# Patient Record
Sex: MALE | Race: WHITE | URBAN - METROPOLITAN AREA
[De-identification: names, ages, dates, MRNs, and addresses within clinical notes are randomized per-mention and may not be internally consistent; named-entity substitution may affect disease eponyms.]

---

## 2022-01-25 ENCOUNTER — PROCEDURE VISIT (OUTPATIENT)
Dept: AUDIOLOGY | Age: 82
End: 2022-01-25
Payer: MEDICARE

## 2022-01-25 ENCOUNTER — OFFICE VISIT (OUTPATIENT)
Dept: ENT CLINIC | Age: 82
End: 2022-01-25
Payer: MEDICARE

## 2022-01-25 ENCOUNTER — TELEPHONE (OUTPATIENT)
Dept: ENT CLINIC | Age: 82
End: 2022-01-25

## 2022-01-25 VITALS — TEMPERATURE: 97.5 F | HEART RATE: 75 BPM | DIASTOLIC BLOOD PRESSURE: 84 MMHG | SYSTOLIC BLOOD PRESSURE: 135 MMHG

## 2022-01-25 DIAGNOSIS — K21.9 GASTROESOPHAGEAL REFLUX DISEASE, UNSPECIFIED WHETHER ESOPHAGITIS PRESENT: Primary | ICD-10-CM

## 2022-01-25 DIAGNOSIS — H90.A32 MIXED CONDUCTIVE AND SENSORINEURAL HEARING LOSS OF LEFT EAR WITH RESTRICTED HEARING OF RIGHT EAR: ICD-10-CM

## 2022-01-25 DIAGNOSIS — J30.9 ALLERGIC RHINITIS, UNSPECIFIED SEASONALITY, UNSPECIFIED TRIGGER: ICD-10-CM

## 2022-01-25 DIAGNOSIS — C44.02 SQUAMOUS CELL CARCINOMA OF SKIN OF LOWER LIP: ICD-10-CM

## 2022-01-25 DIAGNOSIS — H61.22 IMPACTED CERUMEN OF LEFT EAR: ICD-10-CM

## 2022-01-25 DIAGNOSIS — K21.9 LARYNGOPHARYNGEAL REFLUX (LPR): ICD-10-CM

## 2022-01-25 DIAGNOSIS — H90.3 SENSORINEURAL HEARING LOSS (SNHL) OF BOTH EARS: Primary | ICD-10-CM

## 2022-01-25 PROCEDURE — G8484 FLU IMMUNIZE NO ADMIN: HCPCS | Performed by: STUDENT IN AN ORGANIZED HEALTH CARE EDUCATION/TRAINING PROGRAM

## 2022-01-25 PROCEDURE — 92557 COMPREHENSIVE HEARING TEST: CPT | Performed by: AUDIOLOGIST

## 2022-01-25 PROCEDURE — 4004F PT TOBACCO SCREEN RCVD TLK: CPT | Performed by: STUDENT IN AN ORGANIZED HEALTH CARE EDUCATION/TRAINING PROGRAM

## 2022-01-25 PROCEDURE — 99204 OFFICE O/P NEW MOD 45 MIN: CPT | Performed by: STUDENT IN AN ORGANIZED HEALTH CARE EDUCATION/TRAINING PROGRAM

## 2022-01-25 PROCEDURE — G8421 BMI NOT CALCULATED: HCPCS | Performed by: STUDENT IN AN ORGANIZED HEALTH CARE EDUCATION/TRAINING PROGRAM

## 2022-01-25 PROCEDURE — 92567 TYMPANOMETRY: CPT | Performed by: AUDIOLOGIST

## 2022-01-25 PROCEDURE — 69210 REMOVE IMPACTED EAR WAX UNI: CPT | Performed by: STUDENT IN AN ORGANIZED HEALTH CARE EDUCATION/TRAINING PROGRAM

## 2022-01-25 PROCEDURE — 4040F PNEUMOC VAC/ADMIN/RCVD: CPT | Performed by: STUDENT IN AN ORGANIZED HEALTH CARE EDUCATION/TRAINING PROGRAM

## 2022-01-25 PROCEDURE — 1123F ACP DISCUSS/DSCN MKR DOCD: CPT | Performed by: STUDENT IN AN ORGANIZED HEALTH CARE EDUCATION/TRAINING PROGRAM

## 2022-01-25 PROCEDURE — G8427 DOCREV CUR MEDS BY ELIG CLIN: HCPCS | Performed by: STUDENT IN AN ORGANIZED HEALTH CARE EDUCATION/TRAINING PROGRAM

## 2022-01-25 RX ORDER — OMEPRAZOLE 20 MG/1
20 CAPSULE, DELAYED RELEASE ORAL
Qty: 180 CAPSULE | Refills: 1 | Status: SHIPPED | OUTPATIENT
Start: 2022-01-25

## 2022-01-25 NOTE — PROGRESS NOTES
[address] [facility] SURGERY  NEW PATIENT HISTORY AND PHYSICAL NOTE      Patient Name: Tressa Ovalle  Medical Record Number:  [id number]  Primary Care Physician:  [id number]    ChiefComplaint     Chief Complaint   Patient presents with    Other     Review hearing test, patient states he has had an occasional productive cough, has environmental allergies but has been having flare-up of phlegm, check ears, possible ear wax removal.       History of Present Illness     Tressa Ovalle is an 80 y.o. male presenting with throat clearing, allergies, hearing loss. He has been approximately 6 years since he has been evaluated by ENT and was to establish with a new ENT since Dr. Nata Dooley has retired. Clears throat constantly. Takes Pepcid twice a day after eating. He was placed on this by PCP in [place]. Coughing up phlegm 5 times a day for the past couple weeks. No recent ATBX or oral steroids. Just had cataract surgery. + enviornmental allergies, tested in the past. Was on allergy shots in the past. Currently on zyrtec at night. Takes flonase every morning. No sinonasal irrigation recently but has used these in the past.     + chronic hearing loss, no recent changes. Denies tinnitus. No otalgia. No otorrhea. No history of chronic ear infections. No history of otologic surgery. No family history of early onset hearing loss. No loud noise exposures. Denies exposure to chemotherapy. Denies vertigo. 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 Amy Hoyt on 620 8/2013. Continues to follow with dermatology for this. Last seen by dermatology a few weeks ago.     Past Medical History     Past Medical History:   Diagnosis Date    Allergic rhinitis     Bipolar disorder ([address]     CAD (coronary artery disease)     Dysmetabolic syndrome X     Erectile dysfunction     Malignant neoplasm of prostate ([address]     Pure hypercholesterolemia     Schizophrenia [facility])        Past Surgical History     Past Surgical History:   Procedure Laterality Date    COLONOSCOPY  2013    CYSTOSCOPY      Last one was in [facility] in    [facility]    umbilical    INGUINAL HERNIA REPAIR Bilateral     OTHER SURGICAL HISTORY Left 13    PROSTATECTOMY  2001    Radical>>in remission    SEPTOPLASTY         Family History     History reviewed. No pertinent family history. Social History     Social History     Tobacco Use    Smoking status: Former Smoker     Packs/day: 1.00     Years: 12.00     Pack years: 12.00     Quit date: 1972     Years since quittin.1    Smokeless tobacco: Not on file   Substance Use Topics    Alcohol use: Yes     Alcohol/week: 1.0 standard drink     Types: 1 Glasses of wine per week     Comment: Occasionally    Drug use: No        Allergies     No Known Allergies    Medications     Current Outpatient Medications   Medication Sig Dispense Refill    omeprazole (PRILOSEC) 20 MG delayed release capsule Take 1 capsule by mouth 2 times daily (before meals) 180 capsule 1    fluticasone-vilanterol (BREO ELLIPTA) 100-25 MCG/INH AEPB Inhale 100 mcg into the lungs daily 1 each 2    Fluticasone Furoate-Vilanterol 200-25 MCG/INH AEPB Inhale into the lungs daily      albuterol sulfate HFA (PROAIR HFA) 108 (90 BASE) MCG/ACT inhaler Inhale 2 puffs into the lungs every 4 hours as needed for Wheezing 1 Inhaler 3    fluticasone (FLONASE) 50 MCG/ACT nasal spray USE 1 SPRAY IN EACH NOSTRIL EVERY DAY 1 Bottle 3    atorvastatin (LIPITOR) 20 MG tablet Take 40 mg by mouth daily.  thioridazine (MELLARIL) 50 MG tablet Take 50 mg by mouth nightly.  atorvastatin (LIPITOR) 20 MG tablet Take 1 tablet by mouth daily. 30 tablet 2     No current facility-administered medications for this visit.        Review of Systems     REVIEW OF SYSTEMS  The following systems were reviewed and revealed the following in addition to any already discussed in the HPI:    CONSTITUTIONAL: no weight loss, no fever, no night sweats, no chills  EYES: no vision changes, no blurry vision  EARS: +hearing loss, no otalgia  NOSE: no epistaxis, no rhinorrhea  THROAT: No voice changes, no sore throat, no dysphagia      PhysicalExam     Vitals:    01/25/22 1117   BP: 135/84   Site: Left Upper Arm   Position: Sitting   Cuff Size: Medium Adult   Pulse: 75   Temp: 97.5 °F (36.4 °C)   TempSrc: Temporal       PHYSICAL EXAM  /84 (Site: Left Upper Arm, Position: Sitting, Cuff Size: Medium Adult)   Pulse 75   Temp 97.5 °F (36.4 °C) (Temporal)     GENERAL: No acute distress, alert and oriented, no hoarseness  EYES: EOMI, Anti-icteric  NOSE: On anterior rhinoscopy there is no epistaxis, nasal mucosa moist and normal appearing, no purulent drainage. EARS: Normal external appearance; on portable otomicroscopy:     -Ad: External auditory canal without stenosis, tympanic membrane clear, no middle ear effusions or retractions.      -As: External auditory canal with cerumen impaction, see procedure note below. Pneumatic otoscopy: Bilateral tympanic membranes mobile pneumatic otoscopy  FACE: HB 1/6 bilaterally, symmetric appearing, sensation equal bilaterally  ORAL CAVITY: No masses or lesions visualized or palpated, uvula is midline, moist mucous membranes, no evidence of lower lip cancer recurrence  NECK: Normal range of motion, no thyromegaly, trachea is midline, no palpable lymphadenopathy or neck masses, no crepitus  NEURO: Cranial Nerves 2, 3, 4, 5, 6, 7, 11, 12 grossly intact bilaterally     I have performed a head and neck physical exam personally or was physically present during the key or critical portions of the service.     Procedure     Procedure: Binocular otomicroscopy with debridement of cerumen impaction    Pre-op: Cerumen impaction of the left external auditory canal  Post op: Same  Procedure : Binocular otomicroscopy with debridement of cerumen of left external auditory canal  Surgeon: Dr. Ember Landa DO  Estimated Blood Loss: None    Description of Procedure:    After obtaining verbal consent, the patient was placed in the examination chair in the reclined position.      -Left ear: External auditory canal with occluding cerumen limiting visualization of the tympanic membrane which was removed with use of #7 and #5 Slovak suction, alligator forceps, and cerumen loop curet. After successful removal of cerumen, the left tympanic membrane was visualized and without significant retractions or cholesteatoma, no middle ear effusions. * Patient tolerated the procedure well with no complications    Data/Imaging Review     Comprehensive audiological evaluation performed in the office today by Uriah MERINO was independently reviewed by myself which demonstrates:    As: Mild to moderate mixed hearing loss    Ad: Normal to moderate sensorineural hearing loss    % right, WRS 92% left. Type A tympanogram right. Type As tympanogram left. Ipsilateral acoustic reflexes present on the right. No response to ipsilateral acoustic reflexes on the left. Assessment and Plan     1. Gastroesophageal reflux disease, unspecified whether esophagitis present  2. Laryngopharyngeal reflux (LPR)  -Stop Pepcid, start omeprazole 20 mg twice daily. Follow-up with PCP for continued management  -Discussed lifestyle modifications including dietary changes    3. Allergic rhinitis, unspecified seasonality, unspecified trigger  -Start sinonasal saline irrigations daily followed by Flonase installation  -Continue Zyrtec daily    4. Mixed conductive and sensorineural hearing loss of left ear with restricted hearing of right ear  -Mixed hearing loss on the left with absent ipsilateral acoustic reflexes may indicate otosclerosis. Hearing loss threshold levels are mild to moderate.   At this time would recommend amplification with hearing aids, copy of comprehensive audiological evaluation as well as hearing aid clearance form provided to the patient. .  Recommend repeat audiogram in 1 to 2 years or for any acute changes in hearing  - Encouraged loud noise avoidance and wearing of hearing protection when exposed. 5. 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 Continue follow-up with dermatology    6. Impacted cerumen of left ear  - Cerumen debrided in the office today  - Avoid Q-tip and self instrumentation of ears  - Hydrogen peroxide or Debrox (OTC) drops as needed or once every other week to help break up and soften wax  - Follow-up as needed for repeat debridement    Start omeprazole, stop pepcid. Follow Up     Return if symptoms worsen or fail to improve. Brad Aleman   Department of Otolaryngology/Head & Neck Surgery  1/25/22    Medical Decision Making: The following items were considered in medical decision making:  Independent review of images  Review / order clinical lab tests  Review / order radiology tests  Decision to obtain old records    This note was generated completely or in part utilizing Dragon dictation speech recognition software. Occasionally, words are mistranscribed and despite editing, the text may contain inaccuracies due to incorrect word recognition. If further clarification is needed please contact the office at [phone].

## 2022-01-25 NOTE — PROGRESS NOTES
[facility]  Division of Audiology/Otolaryngology    1/25/2022     PCP: Flakita Makenzie   MRN: [id number]     AUDIOLOGIC AND OTHER PERTINENT MEDICAL HISTORY:      Anna Gibson was seen for hearing and middle ear evaluation. Monserrat Dougherty DO is referral source of today's appointment. Patient presents with hearing losshe notices difficulty with speech clarity while conversing on occasions. Denies tinnitus. ASSESSMENT AND FINDINGS:     Otoscopy revealed: Visible tympanic membrane bilaterally    RIGHT EAR:  Hearing Sensitivity: Normal-moderate sensory loss   Word Recognition: Excellent (%), based on NU-6   Tympanometry: Normal peak pressure and compliance, Type A tympanogram, consistent with normal middle ear function. Acoustic Reflexes: Ipsilateral: Present at elevated sensation levels and Absent. LEFT EAR:  Hearing Sensitivity: Mild-moderate mixed loss, 20 dB air/bone gaps at .5 and 4 kHz   Word Recognition: Excellent (%), based on NU-6   Tympanometry: Normal peak pressure and compliance, Type A tympanogram, consistent with normal middle ear function. Acoustic Reflexes: Ipsilateral: Absent. Quick SIN (hearing performance in noise) assessment was administered. Anna Gibson scored 23 /25.5, consistent with 2.5 dB signal-noise ratio loss. This suggests difficulty hearing during the presence of noise or complex listening enviornments. COUNSELING:        Reviewed purpose of testing completed today, general auditory system function, and results obtained today. Patient was provided with a copy of audiogram.         Degree of   Hearing Sensitivity dB Range   Within Normal Limits (WNL) 0 - 20   Mild 20 - 40   Moderate 40 - 55   Moderately-Severe 55 - 70   Severe 70 - 90   Profound 90 +        RECOMMENDATIONS:          Mnoserrat Dougherty DO to medically advise.       Electronically signed by Pura Crawford on 1/25/22 at 11:00 AM.

## 2022-01-27 ENCOUNTER — TELEPHONE (OUTPATIENT)
Dept: ENT CLINIC | Age: 82
End: 2022-01-27